# Patient Record
Sex: FEMALE | Race: OTHER
[De-identification: names, ages, dates, MRNs, and addresses within clinical notes are randomized per-mention and may not be internally consistent; named-entity substitution may affect disease eponyms.]

---

## 2022-07-28 ENCOUNTER — HOSPITAL ENCOUNTER (INPATIENT)
Dept: HOSPITAL 54 - ER | Age: 77
LOS: 3 days | Discharge: NURSING, REHAB OR LONG-TERM CARE | DRG: 389 | End: 2022-07-31
Attending: INTERNAL MEDICINE | Admitting: NURSE PRACTITIONER
Payer: MEDICARE

## 2022-07-28 VITALS — BODY MASS INDEX: 19.18 KG/M2 | WEIGHT: 134 LBS | HEIGHT: 70 IN

## 2022-07-28 VITALS — SYSTOLIC BLOOD PRESSURE: 131 MMHG | DIASTOLIC BLOOD PRESSURE: 87 MMHG

## 2022-07-28 DIAGNOSIS — Z91.19: ICD-10-CM

## 2022-07-28 DIAGNOSIS — F99: ICD-10-CM

## 2022-07-28 DIAGNOSIS — G89.4: ICD-10-CM

## 2022-07-28 DIAGNOSIS — E78.5: ICD-10-CM

## 2022-07-28 DIAGNOSIS — I10: ICD-10-CM

## 2022-07-28 DIAGNOSIS — K56.7: ICD-10-CM

## 2022-07-28 DIAGNOSIS — K56.41: Primary | ICD-10-CM

## 2022-07-28 DIAGNOSIS — Z20.822: ICD-10-CM

## 2022-07-28 LAB
ALBUMIN SERPL BCP-MCNC: 2.8 G/DL (ref 3.4–5)
ALP SERPL-CCNC: 89 U/L (ref 46–116)
ALT SERPL W P-5'-P-CCNC: 16 U/L (ref 12–78)
AST SERPL W P-5'-P-CCNC: 23 U/L (ref 15–37)
BASOPHILS # BLD AUTO: 0 K/UL (ref 0–0.2)
BASOPHILS NFR BLD AUTO: 0.3 % (ref 0–2)
BILIRUB DIRECT SERPL-MCNC: 0.2 MG/DL (ref 0–0.2)
BILIRUB SERPL-MCNC: 0.5 MG/DL (ref 0.2–1)
BUN SERPL-MCNC: 11 MG/DL (ref 7–18)
CALCIUM SERPL-MCNC: 9.3 MG/DL (ref 8.5–10.1)
CHLORIDE SERPL-SCNC: 101 MMOL/L (ref 98–107)
CO2 SERPL-SCNC: 29 MMOL/L (ref 21–32)
CREAT SERPL-MCNC: 0.6 MG/DL (ref 0.6–1.3)
EOSINOPHIL NFR BLD AUTO: 3.3 % (ref 0–6)
GLUCOSE SERPL-MCNC: 109 MG/DL (ref 74–106)
HCT VFR BLD AUTO: 37 % (ref 33–45)
HGB BLD-MCNC: 12.3 G/DL (ref 11.5–14.8)
LIPASE SERPL-CCNC: 86 U/L (ref 73–393)
LYMPHOCYTES NFR BLD AUTO: 1.4 K/UL (ref 0.8–4.8)
LYMPHOCYTES NFR BLD AUTO: 17.3 % (ref 20–44)
MCHC RBC AUTO-ENTMCNC: 33 G/DL (ref 31–36)
MCV RBC AUTO: 89 FL (ref 82–100)
MONOCYTES NFR BLD AUTO: 0.6 K/UL (ref 0.1–1.3)
MONOCYTES NFR BLD AUTO: 7.4 % (ref 2–12)
NEUTROPHILS # BLD AUTO: 5.6 K/UL (ref 1.8–8.9)
NEUTROPHILS NFR BLD AUTO: 71.7 % (ref 43–81)
PLATELET # BLD AUTO: 265 K/UL (ref 150–450)
POTASSIUM SERPL-SCNC: 4.1 MMOL/L (ref 3.5–5.1)
PROT SERPL-MCNC: 7.4 G/DL (ref 6.4–8.2)
RBC # BLD AUTO: 4.19 MIL/UL (ref 4–5.2)
SODIUM SERPL-SCNC: 137 MMOL/L (ref 136–145)
WBC NRBC COR # BLD AUTO: 7.8 K/UL (ref 4.3–11)

## 2022-07-28 PROCEDURE — G0378 HOSPITAL OBSERVATION PER HR: HCPCS

## 2022-07-28 PROCEDURE — C9803 HOPD COVID-19 SPEC COLLECT: HCPCS

## 2022-07-28 RX ADMIN — ATORVASTATIN CALCIUM SCH MG: 10 TABLET, FILM COATED ORAL at 23:31

## 2022-07-28 RX ADMIN — BACLOFEN SCH MG: 10 TABLET ORAL at 23:31

## 2022-07-28 RX ADMIN — SODIUM CHLORIDE PRN MLS/HR: 9 INJECTION, SOLUTION INTRAVENOUS at 23:32

## 2022-07-28 NOTE — NUR
[id number] from care facility c/o abd pain and constipation, last BM a week ago. 
On room air, breathing evenly and unlabored. COnnected to the monitor and pulse 
ox. Kept comfortable, will continue to monitor accordingly.

## 2022-07-29 VITALS — SYSTOLIC BLOOD PRESSURE: 147 MMHG | DIASTOLIC BLOOD PRESSURE: 99 MMHG

## 2022-07-29 VITALS — SYSTOLIC BLOOD PRESSURE: 144 MMHG | DIASTOLIC BLOOD PRESSURE: 93 MMHG

## 2022-07-29 VITALS — DIASTOLIC BLOOD PRESSURE: 85 MMHG | SYSTOLIC BLOOD PRESSURE: 146 MMHG

## 2022-07-29 LAB
BASOPHILS # BLD AUTO: 0 K/UL (ref 0–0.2)
BASOPHILS NFR BLD AUTO: 0.2 % (ref 0–2)
BUN SERPL-MCNC: 8 MG/DL (ref 7–18)
CALCIUM SERPL-MCNC: 8.9 MG/DL (ref 8.5–10.1)
CHLORIDE SERPL-SCNC: 102 MMOL/L (ref 98–107)
CO2 SERPL-SCNC: 27 MMOL/L (ref 21–32)
CREAT SERPL-MCNC: 0.5 MG/DL (ref 0.6–1.3)
EOSINOPHIL NFR BLD AUTO: 1.6 % (ref 0–6)
GLUCOSE SERPL-MCNC: 100 MG/DL (ref 74–106)
HCT VFR BLD AUTO: 38 % (ref 33–45)
HGB BLD-MCNC: 12.3 G/DL (ref 11.5–14.8)
LYMPHOCYTES NFR BLD AUTO: 1.2 K/UL (ref 0.8–4.8)
LYMPHOCYTES NFR BLD AUTO: 15.4 % (ref 20–44)
MAGNESIUM SERPL-MCNC: 2 MG/DL (ref 1.8–2.4)
MCHC RBC AUTO-ENTMCNC: 32 G/DL (ref 31–36)
MCV RBC AUTO: 89 FL (ref 82–100)
MONOCYTES NFR BLD AUTO: 0.5 K/UL (ref 0.1–1.3)
MONOCYTES NFR BLD AUTO: 5.9 % (ref 2–12)
NEUTROPHILS # BLD AUTO: 6.2 K/UL (ref 1.8–8.9)
NEUTROPHILS NFR BLD AUTO: 76.9 % (ref 43–81)
PHOSPHATE SERPL-MCNC: 3.8 MG/DL (ref 2.5–4.9)
PLATELET # BLD AUTO: 279 K/UL (ref 150–450)
POTASSIUM SERPL-SCNC: 3.3 MMOL/L (ref 3.5–5.1)
RBC # BLD AUTO: 4.27 MIL/UL (ref 4–5.2)
SODIUM SERPL-SCNC: 137 MMOL/L (ref 136–145)
WBC NRBC COR # BLD AUTO: 8.1 K/UL (ref 4.3–11)

## 2022-07-29 RX ADMIN — ATORVASTATIN CALCIUM SCH MG: 10 TABLET, FILM COATED ORAL at 22:25

## 2022-07-29 RX ADMIN — LACTULOSE SCH G: 10 SOLUTION ORAL at 17:18

## 2022-07-29 RX ADMIN — POLYETHYLENE GLYCOL 3350 SCH GM: 17 POWDER, FOR SOLUTION ORAL at 08:32

## 2022-07-29 RX ADMIN — ALLOPURINOL SCH MG: 100 TABLET ORAL at 17:19

## 2022-07-29 RX ADMIN — DOCUSATE SODIUM SCH MG: 50 LIQUID ORAL at 17:00

## 2022-07-29 RX ADMIN — ACETAMINOPHEN PRN MG: 325 TABLET ORAL at 22:25

## 2022-07-29 RX ADMIN — LACTULOSE SCH G: 10 SOLUTION ORAL at 09:35

## 2022-07-29 RX ADMIN — DOCUSATE SODIUM SCH MG: 50 LIQUID ORAL at 17:18

## 2022-07-29 RX ADMIN — BACLOFEN SCH MG: 10 TABLET ORAL at 17:18

## 2022-07-29 RX ADMIN — LACTULOSE SCH G: 10 SOLUTION ORAL at 09:00

## 2022-07-29 RX ADMIN — ACETAMINOPHEN PRN MG: 325 TABLET ORAL at 12:58

## 2022-07-29 RX ADMIN — BACLOFEN SCH MG: 10 TABLET ORAL at 22:25

## 2022-07-29 RX ADMIN — SODIUM CHLORIDE PRN MLS/HR: 9 INJECTION, SOLUTION INTRAVENOUS at 17:40

## 2022-07-29 RX ADMIN — BACLOFEN SCH MG: 10 TABLET ORAL at 12:58

## 2022-07-29 RX ADMIN — ACETAMINOPHEN PRN MG: 325 TABLET ORAL at 04:32

## 2022-07-29 RX ADMIN — DOCUSATE SODIUM SCH MG: 50 LIQUID ORAL at 08:32

## 2022-07-29 RX ADMIN — ALLOPURINOL SCH MG: 100 TABLET ORAL at 08:30

## 2022-07-29 RX ADMIN — BACLOFEN SCH MG: 10 TABLET ORAL at 08:29

## 2022-07-29 RX ADMIN — METOPROLOL SUCCINATE SCH MG: 25 TABLET, EXTENDED RELEASE ORAL at 08:30

## 2022-07-29 NOTE — NUR
MS/TELE/RN

POTASSIUM LEVEL THIS MORNING WAS 3.3, NOT REPLACED. INFORMED RISHI KEENE NP, RECEIVED ORDER 
FOR POTASSIUM 20 MEQ PO X 1, PATIENT REFUSED TO TAKE IT. NOTIFIED RISHI KEENE NP, THAT 
PATIENT REFUSED TO TAKE IT. NO FURTHER ORDER RECEIVED. PER RISHI, JUST RECHECKED POTASSIUM 
LEVEL IN A.M.

## 2022-07-29 NOTE — NUR
MS RN OPENING NOTE



PATIENT LAYING IN BED, A/O X 4, ABLE TO MAKE NEEDS KNOWN. TOLERATING WELL ON ROOM AIR WITH 
NO S/S RESPIRATORY DISTRESS. NO COMPLAINTS OF PAIN OR DISCOMFORT AT THIS TIME. R AC # 20 G 
CLEAN, INTACT AND FLUSHING WELL. R FA # 22 G CLEAN, INTACT, AND INFUSING NS @ 75 ML/HR. 
SAFETY MEASURES IN PLACE: BED IN LOWEST LOCKED POSITION, SIDE RAILS UP X 2, CALL LIGHT 
WITHIN REACH. WILL CONTINUE TO MONITOR.

## 2022-07-29 NOTE — NUR
MS RN NOTES



PATIENT ALLOWED SKIN ASSESSMENT DURING SHIFT, PHOTOS TAKEN OF REDNESS ON SACRUM AND GROIN 
AREA AND ADDED TO CHART.

## 2022-07-29 NOTE — NUR
MS/TELE/RN

AT INITIAL SHIFT ROUNDING, PATIENT WAS IN BED APPEARED TO BE SLEEPING, NO SIGNS OF DISTRESS 
NOTE, CALL LIGHT IN REACH, FALL PRECAUTIONS PER PROTOCOL IMPLEMENTED, WILL MONITOR.

## 2022-07-29 NOTE — NUR
MS RN CLOSING NOTES



PATIENT LAYING IN BED, A/O X 4, ABLE TO MAKE NEEDS KNOWN. TOLERATING WELL ON ROOM AIR WITH 
NO S/S RESPIRATORY DISTRESS. NO COMPLAINTS OF PAIN OR DISCOMFORT AT THIS TIME. R FA # 22 G 
CLEAN, INTACT, AND INFUSING NS @ 75 ML/HR. SAFETY MEASURES IN PLACE: BED IN LOWEST LOCKED 
POSITION, SIDE RAILS UP X 2, CALL LIGHT WITHIN REACH. ALL NEEDS MET. WILL ENDORSE TO NIGHT 
SHIFT FOR DINESH.

## 2022-07-29 NOTE — NUR
MS/LYNDA/RN

AT 2110 LAST NIGHT, RECEIVED PATIENT FROM [facility]. PATIENT WAS AWAKE, ALERT,  AND 
ORIENTED, ADMITTED FOR ABDOMINAL PAIN, AND ILEUS, MADE COMFORTABLE IN BED, ADMISSION DONE, 
PATIENT REFUSED SKIN CHECK AND PHOTOS BUT AGREED TO HAVE PHOTOS TAKEN ON THE BILATERAL UPPER 
EXTREMITIES. PLAN OF CARE DISCUSSED, VERBALISED UNDERSTANDING AND AGREEMENT TO THE PLAN, 
TAUGHT THE USE OF CALL LIGHT AND PLACED IT AT BEDSIDE WITHIN REACH, FALL PRECAUTIONS PER 
PROTOCOL IMPLEMENTED. PATIENT REFUSED ENEMA ORDERED, LACTULOSE AND SENNA. EXPLAINED 
IMPORTANCE OF THE MEDICATION TO HER DIAGNOSIS, VERBALISED UNDERSTANDING BUT STILL REFUSED, 
RISHI KEENE, MCKAY, MADE AWARE. PRESENTLY PATIENT IS AWAKE, TYLENOL WAS GIVEN FOR ABDOMINAL PAIN 
AS PAIN, INSERTED NEW IV AT RT. F/A AS THE IV MACHINE KEEPS ON BEEPING EVERY TIME PATIENT 
BENDS HER RIGHT ARM WHERE THE IVF WAS CONNECTED TO THE RT. AC IV ACCESS. ALL NEEDS ATTENDED, 
WILL MONITOR.

## 2022-07-29 NOTE — NUR
MS/TELE/RN

PATIENT IS SLEEPING AT THIS TIME, NO SIGNS OF DISTRESS NOTED, CALL LIGHT IN REACH, ALL NEEDS 
ATTENDED AT THIS TIME, WILL CONTINUE TO MONITOR.

## 2022-07-30 VITALS — DIASTOLIC BLOOD PRESSURE: 94 MMHG | SYSTOLIC BLOOD PRESSURE: 133 MMHG

## 2022-07-30 VITALS — SYSTOLIC BLOOD PRESSURE: 134 MMHG | DIASTOLIC BLOOD PRESSURE: 91 MMHG

## 2022-07-30 VITALS — SYSTOLIC BLOOD PRESSURE: 135 MMHG | DIASTOLIC BLOOD PRESSURE: 90 MMHG

## 2022-07-30 LAB
BUN SERPL-MCNC: 5 MG/DL (ref 7–18)
CALCIUM SERPL-MCNC: 8.8 MG/DL (ref 8.5–10.1)
CHLORIDE SERPL-SCNC: 103 MMOL/L (ref 98–107)
CO2 SERPL-SCNC: 26 MMOL/L (ref 21–32)
CREAT SERPL-MCNC: 0.5 MG/DL (ref 0.6–1.3)
GLUCOSE SERPL-MCNC: 105 MG/DL (ref 74–106)
POTASSIUM SERPL-SCNC: 3.5 MMOL/L (ref 3.5–5.1)
SODIUM SERPL-SCNC: 140 MMOL/L (ref 136–145)

## 2022-07-30 RX ADMIN — ACETAMINOPHEN PRN MG: 325 TABLET ORAL at 05:06

## 2022-07-30 RX ADMIN — POLYETHYLENE GLYCOL 3350 SCH GM: 17 POWDER, FOR SOLUTION ORAL at 09:02

## 2022-07-30 RX ADMIN — ALLOPURINOL SCH MG: 100 TABLET ORAL at 09:04

## 2022-07-30 RX ADMIN — ALLOPURINOL SCH MG: 100 TABLET ORAL at 17:36

## 2022-07-30 RX ADMIN — BACLOFEN SCH MG: 10 TABLET ORAL at 13:02

## 2022-07-30 RX ADMIN — ACETAMINOPHEN PRN MG: 325 TABLET ORAL at 22:43

## 2022-07-30 RX ADMIN — DOCUSATE SODIUM SCH MG: 50 LIQUID ORAL at 17:36

## 2022-07-30 RX ADMIN — MUPIROCIN SCH GM: 20 OINTMENT TOPICAL at 21:14

## 2022-07-30 RX ADMIN — LACTULOSE SCH G: 10 SOLUTION ORAL at 09:00

## 2022-07-30 RX ADMIN — METOPROLOL SUCCINATE SCH MG: 25 TABLET, EXTENDED RELEASE ORAL at 09:03

## 2022-07-30 RX ADMIN — ATORVASTATIN CALCIUM SCH MG: 10 TABLET, FILM COATED ORAL at 21:14

## 2022-07-30 RX ADMIN — BACLOFEN SCH MG: 10 TABLET ORAL at 09:03

## 2022-07-30 RX ADMIN — LACTULOSE SCH G: 10 SOLUTION ORAL at 17:00

## 2022-07-30 RX ADMIN — LACTULOSE SCH G: 10 SOLUTION ORAL at 09:52

## 2022-07-30 RX ADMIN — LACTULOSE SCH G: 10 SOLUTION ORAL at 17:36

## 2022-07-30 RX ADMIN — DOCUSATE SODIUM SCH MG: 50 LIQUID ORAL at 17:00

## 2022-07-30 RX ADMIN — LACTULOSE SCH G: 10 SOLUTION ORAL at 09:02

## 2022-07-30 RX ADMIN — BACLOFEN SCH MG: 10 TABLET ORAL at 17:36

## 2022-07-30 RX ADMIN — ACETAMINOPHEN PRN MG: 325 TABLET ORAL at 16:35

## 2022-07-30 RX ADMIN — LACTULOSE SCH G: 10 SOLUTION ORAL at 21:20

## 2022-07-30 RX ADMIN — SODIUM CHLORIDE PRN MLS/HR: 9 INJECTION, SOLUTION INTRAVENOUS at 18:42

## 2022-07-30 RX ADMIN — DOCUSATE SODIUM SCH MG: 50 LIQUID ORAL at 09:02

## 2022-07-30 RX ADMIN — BACLOFEN SCH MG: 10 TABLET ORAL at 21:14

## 2022-07-30 RX ADMIN — SODIUM CHLORIDE PRN MLS/HR: 9 INJECTION, SOLUTION INTRAVENOUS at 05:21

## 2022-07-30 NOTE — NUR
MS RN CLOSING NOTES



PATIENT LAYING IN BED, A/O X 4, ABLE TO MAKE NEEDS KNOWN. TOLERATING WELL ON ROOM AIR WITH 
NO S/S RESPIRATORY DISTRESS. NO COMPLAINTS OF PAIN OR DISCOMFORT AT THIS TIME. R FA # 22 G 
CLEAN, INTACT, AND INFUSING NS @ 75 ML/HR. SAFETY MEASURES IN PLACE: BED IN LOWEST LOCKED 
POSITION, SIDE RAILS UP X 2, CALL LIGHT WITHIN REACH. ALL NEEDS MET. WILL ENDORSE TO NIGHT 
SHIFT FOR DINESH. 

-------------------------------------------------------------------------------

Addendum: 07/30/22 at 1938 by RAMON DAVIS RN

-------------------------------------------------------------------------------

R FA # 22 G NO LONGER IN PLACE, L HAND # 22 G SL NOW IN PLACE, CLEAN, INTACT AND FLUSHING 
WELL

## 2022-07-30 NOTE — NUR
MS RN OPENING NOTES



PATIENT LAYING IN BED, A/O X 4, ABLE TO MAKE NEEDS KNOWN. TOLERATING WELL ON ROOM AIR WITH 
NO S/S RESPIRATORY DISTRESS. NO COMPLAINTS OF PAIN OR DISCOMFORT AT THIS TIME. R FA # 22 G 
CLEAN, INTACT, AND INFUSING NS @ 75 ML/HR. SAFETY MEASURES IN PLACE: BED IN LOWEST LOCKED 
POSITION, SIDE RAILS UP X 2, CALL LIGHT WITHIN REACH. WILL CONTINUE TO MONITOR.

## 2022-07-30 NOTE — NUR
MS RN NOTES



650 MG TYLENOL PO PRN ADMINISTERED FOR MILD PAIN. WILL CONTINUE TO MONITOR FOR S/S OF PAIN.

## 2022-07-31 VITALS — DIASTOLIC BLOOD PRESSURE: 106 MMHG | SYSTOLIC BLOOD PRESSURE: 150 MMHG

## 2022-07-31 VITALS — DIASTOLIC BLOOD PRESSURE: 104 MMHG | SYSTOLIC BLOOD PRESSURE: 160 MMHG

## 2022-07-31 RX ADMIN — ALLOPURINOL SCH MG: 100 TABLET ORAL at 17:00

## 2022-07-31 RX ADMIN — ACETAMINOPHEN PRN MG: 325 TABLET ORAL at 09:20

## 2022-07-31 RX ADMIN — POLYETHYLENE GLYCOL 3350 SCH GM: 17 POWDER, FOR SOLUTION ORAL at 09:18

## 2022-07-31 RX ADMIN — DOCUSATE SODIUM SCH MG: 50 LIQUID ORAL at 09:18

## 2022-07-31 RX ADMIN — DOCUSATE SODIUM SCH MG: 50 LIQUID ORAL at 17:00

## 2022-07-31 RX ADMIN — ALLOPURINOL SCH MG: 100 TABLET ORAL at 09:20

## 2022-07-31 RX ADMIN — BACLOFEN SCH MG: 10 TABLET ORAL at 13:01

## 2022-07-31 RX ADMIN — MUPIROCIN SCH GM: 20 OINTMENT TOPICAL at 09:00

## 2022-07-31 RX ADMIN — METOPROLOL SUCCINATE SCH MG: 25 TABLET, EXTENDED RELEASE ORAL at 09:19

## 2022-07-31 RX ADMIN — ACETAMINOPHEN PRN MG: 325 TABLET ORAL at 15:52

## 2022-07-31 RX ADMIN — BACLOFEN SCH MG: 10 TABLET ORAL at 09:20

## 2022-07-31 RX ADMIN — BACLOFEN SCH MG: 10 TABLET ORAL at 17:00

## 2022-07-31 NOTE — NUR
MS RN CLOSING NOTES



PATIENT LAYING IN BED, A/O X 4, ABLE TO MAKE NEEDS KNOWN. TOLERATING WELL ON ROOM AIR WITH 
NO S/S RESPIRATORY DISTRESS. NO COMPLAINTS OF PAIN OR DISCOMFORT AT THIS TIME. L HAND # 22 G 
SL NOW IN PLACE, CLEAN, INTACT AND FLUSHING WELL INTACT, AND INFUSING NS @ 75 ML/HR. SAFETY 
MEASURES IN PLACE: BED IN LOWEST LOCKED POSITION, SIDE RAILS UP X 2, CALL LIGHT WITHIN 
REACH. ALL NEEDS MET. ALL DUE MEDS GIVEN. PRN PAIN MANAGEMENT PROVIDED AS NEEDED. WILL 
ENDORSE TO NIGHT SHIFT FOR DINESH.

## 2022-07-31 NOTE — NUR
MS RN OPENING NOTES



RECEIVED PATIENT LAYING IN BED, A/O X 4, ABLE TO MAKE NEEDS KNOWN. PT ON ROOM AIR WITH NO 
S/S RESPIRATORY DISTRESS. NO COMPLAINTS OF PAIN OR DISCOMFORT AT THIS TIME. R FA # 22 G 
CLEAN, INTACT, AND INFUSING NS @ 75 ML/HR. SAFETY MEASURES IN PLACE: BED IN LOWEST LOCKED 
POSITION, SIDE RAILS UP X 2, CALL LIGHT WITHIN REACH. WILL CONTINUE TO MONITOR.

## 2022-07-31 NOTE — NUR
RN DISCHARGE NOTE:



DISCHARGE ORDER PER MD CARRIED OUT. PT IS STABLE, VS WNL. CONTACTED GREGORY FROM [facility] 
FOR REPORT. IV ACCESS AND ID BAND REMOVED. BELONGINGS LIST AND DISCHARGE INSTRUCTIONS SIGNED 
BY PATIENT. PHOTO OF SKIN DISCOLORATION TAKEN AND AFFIXED TO CHART. MEDICATION TEACHING 
DONE, PT VERBALIZED UNDERSTANDING. EMT TRANSPORTED PT AT 1805.